# Patient Record
Sex: FEMALE
[De-identification: names, ages, dates, MRNs, and addresses within clinical notes are randomized per-mention and may not be internally consistent; named-entity substitution may affect disease eponyms.]

---

## 2018-02-10 ENCOUNTER — TRANSCRIPTION ENCOUNTER (OUTPATIENT)
Age: 58
End: 2018-02-10

## 2020-01-27 PROBLEM — Z00.00 ENCOUNTER FOR PREVENTIVE HEALTH EXAMINATION: Status: ACTIVE | Noted: 2020-01-27

## 2020-01-28 ENCOUNTER — APPOINTMENT (OUTPATIENT)
Dept: ORTHOPEDIC SURGERY | Facility: CLINIC | Age: 60
End: 2020-01-28
Payer: COMMERCIAL

## 2020-01-28 PROCEDURE — 99204 OFFICE O/P NEW MOD 45 MIN: CPT

## 2020-01-28 PROCEDURE — 73562 X-RAY EXAM OF KNEE 3: CPT | Mod: 50

## 2020-01-28 RX ORDER — CELECOXIB 200 MG/1
200 CAPSULE ORAL DAILY
Qty: 60 | Refills: 0 | Status: ACTIVE | COMMUNITY
Start: 2020-01-28 | End: 1900-01-01

## 2020-01-28 NOTE — HISTORY OF PRESENT ILLNESS
[de-identified] : This is the first time visit for this patient she is otherwise relatively healthy and is complaining of lateral right knee pain for approximately 3 months. Patient recalls no specific accident or injury the pain is intermittent sharp.

## 2020-01-28 NOTE — DISCUSSION/SUMMARY
[de-identified] : Patient has a history and clinical exam both consistent with either hamstring tendinitis insertional on the lateral aspect of the knee or a lateral meniscal tear. She was placed on Celebrex 200 mg twice a day for 6 day course and be taken as needed. She will return next week if not improved possible cortisone injection. If symptoms persist an MRI may be warranted thereafter. [Medication Risks Reviewed] : Medication risks reviewed

## 2020-01-28 NOTE — PHYSICAL EXAM
[de-identified] : AP standing individual lateral and sunrise views were obtained of the right knee show well-preserved compartments and all 3 joint spaces. [de-identified] : Right knee patient has lateral joint line tenderness she is also tenderness in the area of the hamstring insertion. Range of motion is full there is minimal warmth she is neurovascularly intact distally no effusion minimal soft tissue swelling.

## 2020-01-28 NOTE — REASON FOR VISIT
[Follow-Up Visit] : a follow-up visit for [FreeTextEntry2] : YOUNG KNEE PAIN - RIGHT KNEE WORSE THAN LEFT - NO FALLS OR ACCIDENT - SENT FOR NEW XRAYS

## 2020-05-04 DIAGNOSIS — M25.561 PAIN IN RIGHT KNEE: ICD-10-CM

## 2020-07-14 ENCOUNTER — APPOINTMENT (OUTPATIENT)
Dept: ENDOCRINOLOGY | Facility: CLINIC | Age: 60
End: 2020-07-14
Payer: COMMERCIAL

## 2020-07-14 VITALS
HEIGHT: 62 IN | TEMPERATURE: 98.2 F | RESPIRATION RATE: 16 BRPM | SYSTOLIC BLOOD PRESSURE: 127 MMHG | WEIGHT: 244 LBS | DIASTOLIC BLOOD PRESSURE: 73 MMHG | BODY MASS INDEX: 44.9 KG/M2 | OXYGEN SATURATION: 97 % | HEART RATE: 56 BPM

## 2020-07-14 DIAGNOSIS — Z87.891 PERSONAL HISTORY OF NICOTINE DEPENDENCE: ICD-10-CM

## 2020-07-14 DIAGNOSIS — I10 ESSENTIAL (PRIMARY) HYPERTENSION: ICD-10-CM

## 2020-07-14 DIAGNOSIS — Z83.438 FAMILY HISTORY OF OTHER DISORDER OF LIPOPROTEIN METABOLISM AND OTHER LIPIDEMIA: ICD-10-CM

## 2020-07-14 DIAGNOSIS — Z83.49 FAMILY HISTORY OF OTHER ENDOCRINE, NUTRITIONAL AND METABOLIC DISEASES: ICD-10-CM

## 2020-07-14 PROCEDURE — 99203 OFFICE O/P NEW LOW 30 MIN: CPT

## 2020-07-14 RX ORDER — NEBIVOLOL HYDROCHLORIDE 2.5 MG/1
2.5 TABLET ORAL
Refills: 0 | Status: ACTIVE | COMMUNITY
Start: 2020-07-14

## 2020-07-14 RX ORDER — AMLODIPINE BESYLATE AND OLMESARTAN MEDOXOMIL 5; 20 MG/1; MG/1
5-20 TABLET, FILM COATED ORAL
Refills: 0 | Status: ACTIVE | COMMUNITY
Start: 2020-07-14

## 2020-07-14 NOTE — PHYSICAL EXAM
[Alert] : alert [No Acute Distress] : no acute distress [Well Nourished] : well nourished [Obese] : obese [Healthy Appearance] : healthy appearance [Well Developed] : well developed [Normal Voice/Communication] : normal voice communication [Normal Sclera/Conjunctiva] : normal sclera/conjunctiva [No Proptosis] : no proptosis [No LAD] : no lymphadenopathy [Supple] : the neck was supple [No Accessory Muscle Use] : no accessory muscle use [No Respiratory Distress] : no respiratory distress [Normal Rate and Effort] : normal respiratory rate and effort [Normal Rate] : heart rate was normal [No Edema] : no peripheral edema [No Stigmata of Cushings Syndrome] : no stigmata of Cushings Syndrome [Normal Gait] : normal gait [No Clubbing, Cyanosis] : no clubbing  or cyanosis of the fingernails [Acanthosis Nigricans] : no acanthosis nigricans [No Involuntary Movements] : no involuntary movements were seen [Normal Affect] : the affect was normal [No Tremors] : no tremors [Normal Insight/Judgement] : insight and judgment were intact [Normal Mood] : the mood was normal [de-identified] : enlarged, nodular thyroid

## 2020-07-14 NOTE — HISTORY OF PRESENT ILLNESS
[FreeTextEntry1] : CC: Thyroid nodules\par This is a 60-year-old female with multinodular goiter, prediabetes, here for consultation.\par She reports that thyroid nodules were noted on thyroid sonogram in 2015 after her PCP noticed swelling in her neck.\par She had one fine needle aspiration and reports the results were benign.\par She denies a prior history of radiation therapy to the head or neck.\par There is no family history of thyroid cancer.\par She denies obstructive symptoms.

## 2020-07-14 NOTE — CONSULT LETTER
[Consult Letter:] : I had the pleasure of evaluating your patient, [unfilled]. [Dear  ___] : Dear  [unfilled], [Consult Closing:] : Thank you very much for allowing me to participate in the care of this patient.  If you have any questions, please do not hesitate to contact me. [Please see my note below.] : Please see my note below. [FreeTextEntry3] : Laron Whitfield MD, FACE\par  [Sincerely,] : Sincerely,

## 2021-10-06 PROBLEM — I10 ESSENTIAL HYPERTENSION: Status: ACTIVE | Noted: 2020-07-14

## 2021-10-19 ENCOUNTER — APPOINTMENT (OUTPATIENT)
Dept: ENDOCRINOLOGY | Facility: CLINIC | Age: 61
End: 2021-10-19
Payer: COMMERCIAL

## 2021-10-19 VITALS
SYSTOLIC BLOOD PRESSURE: 130 MMHG | OXYGEN SATURATION: 97 % | WEIGHT: 250 LBS | DIASTOLIC BLOOD PRESSURE: 65 MMHG | BODY MASS INDEX: 46.01 KG/M2 | HEIGHT: 62 IN | TEMPERATURE: 98.69 F | HEART RATE: 59 BPM

## 2021-10-19 DIAGNOSIS — E04.2 NONTOXIC MULTINODULAR GOITER: ICD-10-CM

## 2021-10-19 PROCEDURE — 99212 OFFICE O/P EST SF 10 MIN: CPT | Mod: 25

## 2021-10-19 PROCEDURE — 36415 COLL VENOUS BLD VENIPUNCTURE: CPT

## 2021-10-20 PROBLEM — E04.2 MULTIPLE THYROID NODULES: Status: ACTIVE | Noted: 2020-07-14

## 2021-10-20 LAB
ESTIMATED AVERAGE GLUCOSE: 126 MG/DL
HBA1C MFR BLD HPLC: 6 %
TSH SERPL-ACNC: 1.49 UIU/ML

## 2021-10-20 NOTE — PHYSICAL EXAM
[Alert] : alert [Well Nourished] : well nourished [Healthy Appearance] : healthy appearance [Obese] : obese [No Acute Distress] : no acute distress [Well Developed] : well developed [Normal Voice/Communication] : normal voice communication [Normal Sclera/Conjunctiva] : normal sclera/conjunctiva [No Proptosis] : no proptosis [No LAD] : no lymphadenopathy [Supple] : the neck was supple [No Respiratory Distress] : no respiratory distress [No Accessory Muscle Use] : no accessory muscle use [Normal Rate and Effort] : normal respiratory rate and effort [Normal Rate] : heart rate was normal [No Edema] : no peripheral edema [No Stigmata of Cushings Syndrome] : no stigmata of Cushings Syndrome [Normal Gait] : normal gait [No Clubbing, Cyanosis] : no clubbing  or cyanosis of the fingernails [No Involuntary Movements] : no involuntary movements were seen [Acanthosis Nigricans] : no acanthosis nigricans [No Tremors] : no tremors [Normal Affect] : the affect was normal [Normal Insight/Judgement] : insight and judgment were intact [Normal Mood] : the mood was normal [de-identified] : enlarged, nodular thyroid

## 2021-10-20 NOTE — ASSESSMENT
[FreeTextEntry1] : This is a 61-year-old female with multinodular goiter, prediabetes, hypertension, here for follow up.\par Thyroid ultrasound showed right 3 cm thyroid nodule and right 2.5 cm thyroid nodule for which FNA was advised for both.  She was referred for US guided FNA.\par Check TSH.\par She denies obstructive symptoms.\par Negative Danielson sign.

## 2021-10-21 DIAGNOSIS — E06.3 AUTOIMMUNE THYROIDITIS: ICD-10-CM

## 2021-10-25 ENCOUNTER — NON-APPOINTMENT (OUTPATIENT)
Age: 61
End: 2021-10-25

## 2021-10-25 LAB
THYROGLOB AB SERPL-ACNC: <20 IU/ML
THYROPEROXIDASE AB SERPL IA-ACNC: 144 IU/ML

## 2022-10-18 ENCOUNTER — APPOINTMENT (OUTPATIENT)
Dept: ENDOCRINOLOGY | Facility: CLINIC | Age: 62
End: 2022-10-18

## 2023-12-15 NOTE — HISTORY OF PRESENT ILLNESS
[FreeTextEntry1] : CC: Thyroid nodules\par This is a 61-year-old female with multinodular goiter, prediabetes, here for follow up.\par She reports that thyroid nodules were noted on thyroid sonogram in 2015 after her PCP noticed swelling in her neck.\par She had one fine needle aspiration and reports the results were benign.\par She denies a history of radiation therapy to the head or neck.\par There is no family history of thyroid cancer.\par She denies obstructive symptoms.
independent/supervision

## 2024-03-19 ENCOUNTER — APPOINTMENT (OUTPATIENT)
Dept: PULMONOLOGY | Facility: CLINIC | Age: 64
End: 2024-03-19